# Patient Record
(demographics unavailable — no encounter records)

---

## 2019-08-24 NOTE — XRAY REPORT
CHEST 1 VIEW 



INDICATION / CLINICAL INFORMATION:

tachycardia.



COMPARISON: 

None available.



FINDINGS:



SUPPORT DEVICES: None.



HEART / MEDIASTINUM: No significant abnormality. Coronary artery stent is noted on the right.



LUNGS / PLEURA: The lungs are slightly hyperinflated. No acute superimposed pulmonary or pleural abno
rmality noted. No pleural effusion. No pneumothorax. 



ADDITIONAL FINDINGS: No significant additional findings.



IMPRESSION:

1. No acute pulmonary disease..



Signer Name: Gisel Leach MD 

Signed: 8/24/2019 1:33 PM

 Workstation Name: [id number]

## 2019-08-24 NOTE — CAT SCAN REPORT
CT HEAD WITHOUT CONTRAST



INDICATION : 

Altered mental status. Nosebleed. Left head pain.



TECHNIQUE:  Axial, coronal and sagittal CT imaging was performed from the skull apex through the skul
l base without contrast.  All CT scans at this location are performed using CT dose reduction for ALA
RA by means of automated exposure control. 



COMPARISON:  None available.



FINDINGS:  



PARENCHYMA:  No mass, midline shift, hemorrhage, extraaxial collection or acute territorial infarctio
n. Probable chronic ischemic changes are noted along the basal ganglia bilaterally. There is age-appr
opriate generalized atrophy.

VENTRICLES:  Symmetric and normal in size.  

SOFT TISSUES:  Soft tissues including the orbits appear normal.   

BONES:  No acute osseous abnormality.   

SINUSES: A 1.5 cm retention cyst/polyp is seen along the left maxillary sinus. The sinuses and mastoi
d air cells are otherwise clear.

ADDITIONAL FINDINGS: None. 



IMPRESSION: 

1.  No acute abnormality.

2. Additional findings as above.



Signer Name: James Matias MD 

Signed: 8/24/2019 1:35 PM

 Workstation Name: [id number]

## 2019-08-24 NOTE — EVENT NOTE
ED Screening Note


Date of service: 08/24/19


Time: 11:37


ED Screening Note: 


71 female c/o intermittent nosebleed since Wednesdays Having tingle to the left 

side where she had shingles. 





This initial assessment/diagnostic orders/clinical plan/treatment(s) is/are 

subject to change based on patients health status, clinical progression and re-

assessment by fellow clinical providers in the ED. Further treatment and workup 

at subsequent clinical providers discretion. Patient/guardian urged not to elope

from the ED as their condition may be serious if not clinically assessed and 

managed. 





Initial orders include:

## 2019-08-24 NOTE — EMERGENCY DEPARTMENT REPORT
ED General Adult HPI





- General


Chief complaint: Nosebleed


Stated complaint: NOSE BLEED/TINGLING FACE AND BODY


Time Seen by Provider: 08/24/19 11:36


Source: patient


Mode of arrival: Ambulatory


Limitations: No Limitations





- History of Present Illness


Initial comments: 





Patient reports that approximately 3 days ago she experienced a nose bleed.  

Reports another nose bleed yesterday.  Reports no nose bleed today.  Reports 

that she is having some tingling on the left side of her body.  Reports she is 

in town visiting [place] be with her brother who was recently diagnosed 

with cancer.  Reports increased stress lately.  Denies trauma or medication c

hangsulma.  Reports she called her PCP today who recommended she go to the ER for 

evaluation. Denies taking blood thinners


-: Gradual, days(s) (3)


Location: upper extremity (left side tingling), lower extremity (left side 

tingling)


Severity scale (0 -10): 0


Consistency: intermittent


Improves with: none


Worsens with: none


Associated Symptoms: other (left sided tingling and epistaxis).  denies: 

confusion, chest pain, cough, diaphoresis, fever/chills, headaches, loss of 

appetite, malaise, nausea/vomiting, rash, seizure, shortness of breath, syncope,

weakness





- Related Data


                                    Allergies











Allergy/AdvReac Type Severity Reaction Status Date / Time


 


No Known Allergies Allergy   Verified 08/24/19 11:39














ED Review of Systems


ROS: 


Stated complaint: NOSE BLEED/TINGLING FACE AND BODY


Other details as noted in HPI





Other: 





GENERAL: No weight change, fatigue, fever, chills, or night sweats


SKIN: No changes in skin or hair, no itching, no rashes, no jaundice


HEAD: No trauma, headache, or visual changes


EYES: No blurriness, tearing, itching, acute visual loss, conjunctival 

discoloration, or scleral icterus


EARS: No hearing loss,  tinnitus, vertigo, or earache


NOSE: epistaxis.  No rhinorrhea, stuffiness, sneezing, itching


MOUTH: No bleeding gums, hoarseness, sore throat, or swelling


CARDIAC: No new murmur, chest pain, palpitations, dyspnea on exertion, 

orthopnea, PND, or edema


RESPIRATORY: No shortness of breath, wheeze, cough, sputum production, 

hemoptysis, pneumonia, asthma, bronchitis, or emphysema


GI: No change in appetite, nausea, vomiting,  dysphagia, diarrhea, constipation,

hematemesis, melena, hematochezia, or abdominal pain


URINARY: No frequency, urgency, polyuria, dysuria, hematuria, or incontinence


MUSCULOSKELETAL: No muscle weakness, joint stiffness, decrease in range of 

motion, redness, swelling


NEUROLOGIC: Left side tingling.  No headache, loss of sensation, numbness, 

tremors, weakness, paralysis, seizures


HEMATOLOGIC: No anemia, easy bruising, bleeding, petechiae, or purpura


ENDOCRINE: No hot or cold intolerance, sweating, polyuria, polydipsia or, 

polyphagia no thyroid problems








ED Past Medical Hx





- Past Medical History


Previous Medical History?: Yes


Hx Hypertension: Yes


Hx GERD: Yes





- Social History


Smoking Status: Never Smoker


Substance Use Type: None





ED Physical Exam





- General


Limitations: No Limitations





- Other


Other exam information: 





GENERAL: Patient in no acute distress


HEAD: Normocephalic, atraumatic


EYES: PERRLA, EOM intact, no scleral icterus, no conjunctival hemorrhage, visual

fields and acuity wnl


NOSE: No tenderness, discharge, sinus tenderness


MOUTH: No erythema, bleeding, exudate


HEART: Regular rate and rhythm, no murmur, S1-S2 are auscultated, pulses are 

symmetric


LUNGS: Bilateral breath sounds, No tachypnea, No retractions, No wheezing, 

rales, rhonchi


ABDOMEN: Normal bowel sounds, abdomen soft, no tenderness, no rebound, no 

guarding, no distention, no masses, no CVA tenderness


MUSCULOSKELETAL: Normal joint range of motion, no redness, no swelling, no 

tenderness


NEUROLOGIC: GCS 15, Alert and Oriented x3, Cranial nerves intact, normal 

sensation, normal strength, normal gait, no cerebellar deficit, NIHSS 0


SKIN: Skin is warm and dry, no wounds, no rashes





ED Course


                                   Vital Signs











  08/24/19





  11:36


 


Temperature 98.4 F


 


Pulse Rate 77


 


Respiratory 20





Rate 


 


Blood Pressure 118/78


 


O2 Sat by Pulse 96





Oximetry 














ED Medical Decision Making





- Lab Data


Result diagrams: 


                                 08/24/19 12:03





                                 08/24/19 12:03





- EKG Data


When compared to previous EKG there are: no significant change





- Radiology Data


Radiology results: report reviewed





- Medical Decision Making





Patient comfortable.  Updated with results.  Plan discharge with outpatient 

follow up.  Return if any worsening.


Critical care attestation.: 


If time is entered above; I have spent that time in minutes in the direct care 

of this critically ill patient, excluding procedure time.








ED Disposition


Clinical Impression: 


 Paresthesia





Disposition: DC-01 TO HOME OR SELFCARE


Is pt being admited?: No


Condition: Stable


Instructions:  Paresthesia (ED)


Referrals: 


PRIMARY CARE,MD [Primary Care Provider] - 2-3 Days


Time of Disposition: 14:33